# Patient Record
Sex: FEMALE | Race: WHITE | NOT HISPANIC OR LATINO
[De-identification: names, ages, dates, MRNs, and addresses within clinical notes are randomized per-mention and may not be internally consistent; named-entity substitution may affect disease eponyms.]

---

## 2022-04-03 ENCOUNTER — HEALTH MAINTENANCE LETTER (OUTPATIENT)
Age: 22
End: 2022-04-03

## 2022-05-06 ASSESSMENT — SLEEP AND FATIGUE QUESTIONNAIRES
HOW LIKELY ARE YOU TO NOD OFF OR FALL ASLEEP WHILE LYING DOWN TO REST IN THE AFTERNOON WHEN CIRCUMSTANCES PERMIT: MODERATE CHANCE OF DOZING
HOW LIKELY ARE YOU TO NOD OFF OR FALL ASLEEP IN A CAR, WHILE STOPPED FOR A FEW MINUTES IN TRAFFIC: HIGH CHANCE OF DOZING
HOW LIKELY ARE YOU TO NOD OFF OR FALL ASLEEP WHILE SITTING AND READING: SLIGHT CHANCE OF DOZING
HOW LIKELY ARE YOU TO NOD OFF OR FALL ASLEEP WHILE WATCHING TV: MODERATE CHANCE OF DOZING
HOW LIKELY ARE YOU TO NOD OFF OR FALL ASLEEP WHILE SITTING QUIETLY AFTER LUNCH WITHOUT ALCOHOL: MODERATE CHANCE OF DOZING
HOW LIKELY ARE YOU TO NOD OFF OR FALL ASLEEP WHEN YOU ARE A PASSENGER IN A CAR FOR AN HOUR WITHOUT A BREAK: HIGH CHANCE OF DOZING
HOW LIKELY ARE YOU TO NOD OFF OR FALL ASLEEP WHILE SITTING AND TALKING TO SOMEONE: SLIGHT CHANCE OF DOZING
HOW LIKELY ARE YOU TO NOD OFF OR FALL ASLEEP WHILE SITTING INACTIVE IN A PUBLIC PLACE: HIGH CHANCE OF DOZING

## 2022-05-09 ENCOUNTER — CARE COORDINATION (OUTPATIENT)
Dept: SLEEP MEDICINE | Facility: CLINIC | Age: 22
End: 2022-05-09

## 2022-05-09 ENCOUNTER — VIRTUAL VISIT (OUTPATIENT)
Dept: SLEEP MEDICINE | Facility: CLINIC | Age: 22
End: 2022-05-09
Payer: COMMERCIAL

## 2022-05-09 VITALS — WEIGHT: 293 LBS | HEIGHT: 68 IN | BODY MASS INDEX: 44.41 KG/M2

## 2022-05-09 DIAGNOSIS — G47.10 HYPERSOMNIA: Primary | ICD-10-CM

## 2022-05-09 DIAGNOSIS — E66.01 MORBID OBESITY (H): ICD-10-CM

## 2022-05-09 DIAGNOSIS — E66.01 CLASS 3 SEVERE OBESITY DUE TO EXCESS CALORIES WITHOUT SERIOUS COMORBIDITY WITH BODY MASS INDEX (BMI) OF 45.0 TO 49.9 IN ADULT (H): ICD-10-CM

## 2022-05-09 DIAGNOSIS — E66.813 CLASS 3 SEVERE OBESITY DUE TO EXCESS CALORIES WITHOUT SERIOUS COMORBIDITY WITH BODY MASS INDEX (BMI) OF 45.0 TO 49.9 IN ADULT (H): ICD-10-CM

## 2022-05-09 DIAGNOSIS — G47.33 OSA (OBSTRUCTIVE SLEEP APNEA): ICD-10-CM

## 2022-05-09 PROCEDURE — 99204 OFFICE O/P NEW MOD 45 MIN: CPT | Mod: 95 | Performed by: FAMILY MEDICINE

## 2022-05-09 NOTE — PROGRESS NOTES
"Oksana is a 21 year old who is being evaluated via a billable video visit.      How would you like to obtain your AVS? MyChart  If the video visit is dropped, the invitation should be resent by: Text to cell phone: [phone]  Will anyone else be joining your video visit? No  {If patient encounters technical issues they should call [phone] :[id number]}    Video Start Time: {video visit start/end time for provider to select:152948}  Video-Visit Details    Type of service:  Video Visit    Video End Time:{video visit start/end time for provider to select:152948}    Originating Location (pt. Location): {video visit patient location:668951::\"Home\"}    Distant Location (provider location):  [facility]     Platform used for Video Visit: {Virtual Visit Platforms:195225::\"WhiteFence\"}Sarah Salomon  "

## 2022-05-09 NOTE — PROGRESS NOTES
"Oksana Rios is a 21 year old female who is being evaluated via a billable video visit.       The patient has been notified of following:      \"This video visit will be conducted via a call between you and your physician/provider. We have found that certain health care needs can be provided without the need for an in-person physical exam.  This service lets us provide the care you need with a video conversation.  If a prescription is necessary we can send it directly to your pharmacy.  If lab work is needed we can place an order for that and you can then stop by our lab to have the test done at a later time.     Video visits are billed at different rates depending on your insurance coverage.  Please reach out to your insurance provider with any questions.     If during the course of the call the physician/provider feels a video visit is not appropriate, you will not be charged for this service.\"     Patient has given verbal consent for Video visit? Yes  How would you like to obtain your AVS? Mail a copy  If you are dropped from the video visit, the video invite should be resent to: Text to cell phone: -  Will anyone else be joining your video visit? No  If patient encounters technical issues they should call [phone]      Video-Visit Details     Type of service:  Video Visit     Video Start Time: 1pm  Video End Time: 1:50pm    Originating Location (pt. Location): Home     Distant Location (provider location):  [facility]      Platform used for Video Visit: Veam Video    Virtual visit for excessive daytime sleepiness with a history of mild obstructive sleep apnea managed with CPAP.     Assessment / Plan:    1.)  Very mild obstructive sleep apnea managed with CPAP  2.)  Excessive daytime sleepiness  3.)  Morbid obesity with unclear current weight/BMI    Overall, she does appear to reports consistent excessive daytime sleepiness with an elevated Gifford score.  It does appear " that this has not significantly improved with treatment of very mild obstructive sleep apnea, though it is possible that obstructive sleep apnea has changed given there is potential for interim weight changes since her initial home sleep testing.    Given this history of consistent daytime sleepiness, reported minimal improvement with treatment of mild obstructive sleep apnea, younger age, I feel that there is some suggestion or potential for organic hypersomnia.  It is noted that narcolepsy triad is negative.  I feel it is reasonable for repeat testing, and I would recommend actigraphy with PSG (discussed not using her CPAP for 2 weeks prior to testing) with MSLT if no significant findings, along with morning urine toxicology.  These orders were placed today.    Given that TSH, CBC, comprehensive metabolic panel had been checked after the onset of symptoms, I did not see a strong need to repeat those at this time.  We discussed that obesity may also play significant role if no significant hypersomnia seen in respect to chronic daytime fatigue.      SUBJECTIVE:  Oksana iRos is a 21 year old female.    Pertinent PMHx of obesity, anxiety, GOPAL, depression, ADHD, dermatillomania.    Prior Sleep Testin2021 - HST with [employer], with weight 310 lbs, BMI 46.5.  RDI 7.7.  Jose SpO2 79%, <= 88% for 0.2 minutes.    Reviewed visit with Eileen Holliday CNP of [facility] sleep medicine 2021.  Treated with CPAP auto-titrate 5-20 cm H2O with what appears adequate usage and AHI < 0.5.  Note of chronic sleep onset insomnia, referred for CBT-I.    Today -she presents today in regards to getting a second opinion on excessive daytime sleepiness that has not improved since the start of CPAP for mild obstructive sleep apnea.    I did not have a CPAP download for review today, but she reports that she has used it consistently with benefit for reducing snoring and gasping arousals, but does not feel that has  had a significant improvement in daytime sleepiness.  I was able to review some of the records from [facility] sleep medicine, though I did not see specific discussion of hypersomnia as part of her concerns.    She feels that her excessive daytime sleepiness began in early 2020, and this prompted an initial laboratory work-up in February 2020 including TSH, hemoglobin, comprehensive metabolic panel and all of these returned within normal limits.  She feels that her daytime sleepiness has gradually worsened over time.  At this time, she was nearing the end of her college degree and does not feel that there is a significant change in her work stress, school load or sleep pattern.    She identifies that the daytime sleepiness is most concerning in regards to her morning commute to work.  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  When working as a paraprofessional, she has an approximately 45-minute commute each way.  She notes on her drive to work, that she will consistently feel very sleepy after driving for roughly 25-30 minutes.  She will then need to stop, pull over and go the car and walk around for few minutes in order to feel safe enough to drive the remainder of the commute to work.  She feels that she has less issues with her drive home from work.  She does feel that she has excessive sleepiness and other situations where she finds that she will fall asleep in quiet times at home but also at work and this has also become concerning to her.    She does not have any known family history of sleep apnea or hypersomnia or other sleep concerns.    We do not have a current weight on file, though she feels that her weight is stable since her home sleep test.    She denies sleep paralysis, denies hypnagogic or hypnopompic hallucinations, denies cataplexy.    She notes some occasional morning headaches.    Her current medication regimen  includes:  - Effexor  - Guanfacine prescribed for ADHD.  She notes that she was previously prescribed Concerta but had side effects of increasing depression.  She notes that she was taking the Concerta in early 2020 during the time of the onset of her sleepiness and did not seem to have an appreciable benefit for it and also did not seem to worsen it after it was discontinued.  -Oral contraceptive pill, recently changed from a combined estrogen and progesterone to a progesterone only formulation.  She is unsure why.    We reviewed her sleep-wake pattern.  On workdays, she usually go to bed around 10 PM and fall asleep quickly, she will have 0-1 brief awakenings, she will awaken by alarm at 5:30 AM he denies difficulty awakening or feeling significant morning grogginess.  2 times per week she will take a 1-1.5-hour nap, she is unclear if this has benefit or not.  She estimates her total sleep time around 7.5-8 hours.    On weekends or off days, she will sleep from 11 PM until 8 AM.  She is unsure if her sleepiness is better on weekends, since she typically is not having the longer drives where she would notice issues, and potentially would have less likelihood of falling asleep and quite activities during the day.      Insomnia Severity Index    Difficulty falling asleep None    Difficulty staying asleep Mild    Problems waking up too early None    How SATISFIED/DISSATISFIED are you with your CURRENT sleep pattern? Dissatisfied    How NOTICEABLE to others do you think your sleep problem is in terms of impairing the quality of your life? Somewhat    How WORRIED/DISTRESSED are you about your current sleep problem? Somewhat    To what extent do you consider your sleep problem to INTERFERE with your daily functioning (e.g. daytime fatigue, mood, ability to function at work/daily chores, concentration, memory, mood, etc.) CURRENTLY? Much    Total Score 11        Past medical history:    There are no problems to display  for this patient.      10 point ROS of systems including Constitutional, Eyes, Respiratory, Cardiovascular, Gastroenterology, Genitourinary, Integumentary, Muscularskeletal, Psychiatric were all negative except for pertinent positives noted in my HPI.    No current outpatient medications on file.       OBJECTIVE:  There were no vitals taken for this visit.    Physical Exam     ---  This note was written with the assistance of the Dragon voice-dictation technology software. The final document, although reviewed, may contain errors. For corrections, please contact the office.    Alejandro Madrigal MD    Sleep Medicine  [facility]  ([phone])  [facility]  ([phone])    Time spent on the date of service:  55 minutes.

## 2022-05-18 ENCOUNTER — TELEPHONE (OUTPATIENT)
Dept: SLEEP MEDICINE | Facility: CLINIC | Age: 22
End: 2022-05-18
Payer: COMMERCIAL

## 2022-05-18 NOTE — NURSING NOTE
Message sent to [facility] that patient needs to be scheduled for an Actigraphy, PSG, and MSLT.  They will contact patient to schedule. Veronica Martinez, LORIE

## 2022-09-06 ENCOUNTER — OFFICE VISIT (OUTPATIENT)
Dept: SLEEP MEDICINE | Facility: CLINIC | Age: 22
End: 2022-09-06
Payer: COMMERCIAL

## 2022-09-06 DIAGNOSIS — G47.10 HYPERSOMNIA: Primary | ICD-10-CM

## 2022-09-18 ENCOUNTER — THERAPY VISIT (OUTPATIENT)
Dept: SLEEP MEDICINE | Facility: CLINIC | Age: 22
End: 2022-09-18
Payer: COMMERCIAL

## 2022-09-18 DIAGNOSIS — G47.33 OSA (OBSTRUCTIVE SLEEP APNEA): ICD-10-CM

## 2022-09-18 DIAGNOSIS — E66.01 CLASS 3 SEVERE OBESITY DUE TO EXCESS CALORIES WITHOUT SERIOUS COMORBIDITY WITH BODY MASS INDEX (BMI) OF 45.0 TO 49.9 IN ADULT (H): ICD-10-CM

## 2022-09-18 DIAGNOSIS — E66.813 CLASS 3 SEVERE OBESITY DUE TO EXCESS CALORIES WITHOUT SERIOUS COMORBIDITY WITH BODY MASS INDEX (BMI) OF 45.0 TO 49.9 IN ADULT (H): ICD-10-CM

## 2022-09-18 DIAGNOSIS — G47.10 HYPERSOMNIA: ICD-10-CM

## 2022-09-18 PROCEDURE — 95810 POLYSOM 6/> YRS 4/> PARAM: CPT | Performed by: INTERNAL MEDICINE

## 2022-09-19 ENCOUNTER — THERAPY VISIT (OUTPATIENT)
Dept: SLEEP MEDICINE | Facility: CLINIC | Age: 22
End: 2022-09-19
Payer: COMMERCIAL

## 2022-09-19 ENCOUNTER — DOCUMENTATION ONLY (OUTPATIENT)
Dept: SLEEP MEDICINE | Facility: CLINIC | Age: 22
End: 2022-09-19

## 2022-09-19 DIAGNOSIS — G47.10 HYPERSOMNIA: Primary | ICD-10-CM

## 2022-09-19 LAB
AMPHETAMINES UR QL: NOT DETECTED
BARBITURATES UR QL SCN: NOT DETECTED
BENZODIAZ UR QL SCN: NOT DETECTED
BUPRENORPHINE UR QL: NOT DETECTED
CANNABINOIDS UR QL: NOT DETECTED
COCAINE UR QL SCN: NOT DETECTED
D-METHAMPHET UR QL: NOT DETECTED
METHADONE UR QL SCN: NOT DETECTED
OPIATES UR QL SCN: NOT DETECTED
OXYCODONE UR QL SCN: NOT DETECTED
PCP UR QL SCN: NOT DETECTED
PROPOXYPH UR QL: NOT DETECTED
TRICYCLICS UR QL SCN: NOT DETECTED

## 2022-09-19 PROCEDURE — 95805 MULTIPLE SLEEP LATENCY TEST: CPT | Performed by: INTERNAL MEDICINE

## 2022-09-19 PROCEDURE — 80306 DRUG TEST PRSMV INSTRMNT: CPT

## 2022-09-19 NOTE — PATIENT INSTRUCTIONS
[facility]    1. Your sleep study will be reviewed by a sleep physician within the next few days.     2. Please follow up in the sleep clinic as scheduled, or, make an appointment with your sleep provider to be seen within two weeks to discuss the results of the sleep study.    3. If you have any questions or problems with your treatment plan, please contact your sleep clinic provider at [phone] to further manage your condition.    4. Please review your attached medication list, and, at your follow-up appointment advise your sleep clinic provider about any changes.    5. Go to [email or web address] for more information about your sleep problems.    Adriane Padilla, RPSGT  September 19, 2022

## 2022-09-19 NOTE — NURSING NOTE
Diagnostic PSG w/ transcutaneous C02 monitoring  completed per provider order.  Patient did not meet criteria for PAP therapy.

## 2022-09-22 LAB — SLPCOMP: NORMAL

## 2022-10-07 ENCOUNTER — VIRTUAL VISIT (OUTPATIENT)
Dept: SLEEP MEDICINE | Facility: CLINIC | Age: 22
End: 2022-10-07
Payer: COMMERCIAL

## 2022-10-07 VITALS — WEIGHT: 293 LBS | BODY MASS INDEX: 44.41 KG/M2 | HEIGHT: 68 IN

## 2022-10-07 DIAGNOSIS — G47.63 BRUXISM, SLEEP-RELATED: ICD-10-CM

## 2022-10-07 DIAGNOSIS — R06.83 SNORING: Primary | ICD-10-CM

## 2022-10-07 DIAGNOSIS — G47.10 PERSISTENT HYPERSOMNIA: ICD-10-CM

## 2022-10-07 DIAGNOSIS — F51.12 INSUFFICIENT SLEEP SYNDROME: ICD-10-CM

## 2022-10-07 DIAGNOSIS — G47.61 PERIODIC LIMB MOVEMENT DISORDER (PLMD): ICD-10-CM

## 2022-10-07 PROCEDURE — 99214 OFFICE O/P EST MOD 30 MIN: CPT | Mod: 95 | Performed by: NURSE PRACTITIONER

## 2022-10-07 RX ORDER — ACETAMINOPHEN AND CODEINE PHOSPHATE 120; 12 MG/5ML; MG/5ML
SOLUTION ORAL
COMMUNITY
Start: 2022-04-25

## 2022-10-07 RX ORDER — GUANFACINE 2 MG/1
TABLET ORAL
COMMUNITY
Start: 2022-09-11

## 2022-10-07 RX ORDER — VENLAFAXINE HYDROCHLORIDE 150 MG/1
CAPSULE, EXTENDED RELEASE ORAL
COMMUNITY
Start: 2022-09-11

## 2022-10-07 ASSESSMENT — SLEEP AND FATIGUE QUESTIONNAIRES
HOW LIKELY ARE YOU TO NOD OFF OR FALL ASLEEP WHILE WATCHING TV: MODERATE CHANCE OF DOZING
HOW LIKELY ARE YOU TO NOD OFF OR FALL ASLEEP WHILE SITTING AND TALKING TO SOMEONE: SLIGHT CHANCE OF DOZING
HOW LIKELY ARE YOU TO NOD OFF OR FALL ASLEEP WHILE SITTING QUIETLY AFTER LUNCH WITHOUT ALCOHOL: SLIGHT CHANCE OF DOZING
HOW LIKELY ARE YOU TO NOD OFF OR FALL ASLEEP WHILE SITTING INACTIVE IN A PUBLIC PLACE: MODERATE CHANCE OF DOZING
HOW LIKELY ARE YOU TO NOD OFF OR FALL ASLEEP WHILE SITTING AND READING: MODERATE CHANCE OF DOZING
HOW LIKELY ARE YOU TO NOD OFF OR FALL ASLEEP WHILE LYING DOWN TO REST IN THE AFTERNOON WHEN CIRCUMSTANCES PERMIT: MODERATE CHANCE OF DOZING
HOW LIKELY ARE YOU TO NOD OFF OR FALL ASLEEP IN A CAR, WHILE STOPPED FOR A FEW MINUTES IN TRAFFIC: MODERATE CHANCE OF DOZING
HOW LIKELY ARE YOU TO NOD OFF OR FALL ASLEEP WHEN YOU ARE A PASSENGER IN A CAR FOR AN HOUR WITHOUT A BREAK: HIGH CHANCE OF DOZING

## 2022-10-07 NOTE — PATIENT INSTRUCTIONS
**Recommend increasing the amount of your sleep to 8-9 hours per night.  **May also consider alternative causes for your daytime sleepiness through your PCP.      Your BMI is Body mass index is 45.61 kg/m .    What is BMI?  Body mass index (BMI) is one way to tell whether you are at a healthy weight, overweight, or obese. It measures your weight in relation to your height.  A BMI of 18.5 to 24.9 is in the healthy range. A person with a BMI of 25 to 29.9 is considered overweight, and someone with a BMI of 30 or greater is considered obese.  Another way to find out if you are at risk for health problems caused by overweight and obesity is to measure your waist. If you are a woman and your waist is more than 35 inches, or if you are a man and your waist is more than 40 inches, your risk of disease may be higher.  More than two-thirds of American adults are considered overweight or obese. Being overweight or obese increases the risk for further weight gain.  Excess weight may lead to heart disease and diabetes. Creating and following plans for healthy eating and physical activity may help you improve your health.    Methods for maintaining or losing weight.  Weight control is part of healthy lifestyle and includes exercise, emotional health, and healthy eating habits.  Careful eating habits lifelong is the mainstay of weight control.  Though there are significant health benefits from weight loss, long-term weight loss with diet alone may be very difficult to achieve- studies show long-term success with dietary management in less than 10% of people. Attaining a healthy weight may be especially difficult to achieve in those with severe obesity. In some cases, medications, devices and surgical management might be considered.    What can you do?  If you are overweight or obese and are interested in methods for weight loss, you should discuss this with your provider. In addition, we recommend that you review healthy life  styles and methods for weight loss available through the National Institutes of Health patient information sites:   http://win.niddk.nih.gov/publications/index.htm

## 2022-10-07 NOTE — PROGRESS NOTES
Oksana is a 22 year old who is being evaluated via a billable video visit.      How would you like to obtain your AVS? MyChart  If the video visit is dropped, the invitation should be resent by: Send to e-mail at: [email or web address]  Will anyone else be joining your video visit? Terri  Tiffanie Ann        Video-Visit Details    Video Start Time: 10:12 AM    Type of service:  Video Visit    Video End Time:10:30 AM    Originating Location (pt. Location): Home    Distant Location (provider location):  [facility] [place]     Platform used for Video Visit: [facility]       Sleep Study Follow-Up Visit:    Date on this visit: 10/7/2022    Oksana Rios is a 22-year-old female with a PMH pertinent for ADHD, depression, anxiety, dermatillomania, GOPAL, and obesity who presents today for follow-up of her sleep study done on 9/18/2022 at the [facility] was performed to evaluate for hypersomnia.  She was last seen by Dr. Alejandro Madrigal by virtual visit on 5/9/2022.    Sleep latency 8.1 minutes without Ambien.  REM achieved.   REM latency 100.0 minutes.  Sleep efficiency 92.1%. Total sleep time 449.5 minutes.    Snoring - was reported as moderate to severe.    Sleep architecture:  Stage 1, 6.6% (5%), stage 2, 41.3% (45-55%), stage 3, 12.6% (15-20%), stage REM, 39.6% (20-25%).  AHI was 3.2, with desaturations down to 88%. RDI 4.0.  REM RDI 6.7, consistent with mild REM GOPAL.  Supine RDI 3.2, consistent with no SUPINE GOPAL.  Periodic Limb Movement Index 22.4/hour.  The PLM Arousal Index was 6.9 per hour     9/18/2022 [facility] (300.0 lbs) - AHI 3.2, RDI 4.0, Supine AHI 3.2, REM AHI 6.7, Low O2 88.0%, Time Spent ?88% 0.1 minutes / Time Spent ?89% 0.1 minutes.  MSLT Results: UA was negative. The patient s mean sleep latency was within normal limits. Her first 3 naps showed decreased sleep latency which could be suggestive of insufficient sleep or circadian  "phase delay. This test is not conclusive for idiopathic hypersomnia or narcolepsy.  These findings were reviewed with patient.     [facility], with weight 310 lbs, BMI 46.5.  RDI 7.7.  Jose SpO2 79%, <= 88% for 0.2 minutes.     Reviewed visit with Eileen Holliday CNP of [facility] sleep medicine 2021.  Treated with CPAP auto-titrate 5-20 cm H2O with what appears adequate usage and AHI < 0.5.  Note of chronic sleep onset insomnia, referred for CBT-I.      Past medical/surgical history, family history, social history, medications and allergies were reviewed.      Problem List:  Patient Active Problem List    Diagnosis Date Noted     Morbid obesity (H) 2022     Priority: Medium      Current Outpatient Medications   Medication     fexofenadine (ALLEGRA) 30 MG ODT     guanFACINE (TENEX) 2 MG tablet     norethindrone (MICRONOR) 0.35 MG tablet     venlafaxine (EFFEXOR XR) 150 MG 24 hr capsule     No current facility-administered medications for this visit.     Ht 1.727 m (5' 8\")   Wt 136.1 kg (300 lb)   BMI 45.61 kg/m      Impression/Plan:  1. Snoring  2. Bruxism, sleep-related  3. Periodic limb movement disorder (PLMD)  4. Persistent hypersomnia, unspecified  5.  Insufficient sleep syndrome, chronic  - Negative for clinically significant sleep disordered breathing.   - Sleep associated hypoxemia was not present.  - Mean sleep latency was within normal limits. Her first 3 naps showed decreased sleep latency which could be suggestive of insufficient sleep or circadian phase delay. This test is not conclusive for idiopathic hypersomnia or narcolepsy.     Treatment options discussed today including:  Try Increasing total sleep time to 8-9 hours each night.  We also discussed weight loss as the patient is able to help improve overall risk for sleep apnea and general health.  Alternatively, further evaluation for other possible causes of EDS/hypersomnia, such as " metabolic work-up.    She will follow up with me as needed or if symptoms worsen or fail to improve.    **A copy of the PSG and MSLT results to be mailed to the patient, as requested.    30 minutes spent with patient, all of which were spent face-to-face counseling, consulting, chart review/documentation, and coordinating plan of care on the date of the encounter.      LINWOOD Knight CNP  Sleep Medicine      CC: No Ref-Primary, Physician     This note was written with the assistance of the Dragon voice-dictation technology software. The final document, although reviewed, may contain errors. For corrections, please contact the office.

## 2022-10-07 NOTE — NURSING NOTE
Pt to follow up as needed if symptoms worsen or fail to improve.    Mailed copy of PSG and MSLT to patient.    Christophe Roberts CMA

## 2023-05-21 ENCOUNTER — HEALTH MAINTENANCE LETTER (OUTPATIENT)
Age: 23
End: 2023-05-21

## 2023-05-30 ENCOUNTER — TELEPHONE (OUTPATIENT)
Dept: OBGYN | Facility: CLINIC | Age: 23
End: 2023-05-30
Payer: COMMERCIAL

## 2023-05-30 NOTE — TELEPHONE ENCOUNTER
Left VM for Patient to reschedule appt on 6/8.  Midwives are available sooner for the patient as a new with pap.

## 2024-04-27 ENCOUNTER — OFFICE VISIT (OUTPATIENT)
Dept: FAMILY MEDICINE | Facility: CLINIC | Age: 24
End: 2024-04-27
Payer: COMMERCIAL

## 2024-04-27 VITALS
HEIGHT: 68 IN | HEART RATE: 85 BPM | WEIGHT: 293 LBS | TEMPERATURE: 98.3 F | BODY MASS INDEX: 44.41 KG/M2 | RESPIRATION RATE: 18 BRPM

## 2024-04-27 DIAGNOSIS — J03.90 EXUDATIVE TONSILLITIS: Primary | ICD-10-CM

## 2024-04-27 DIAGNOSIS — J02.9 SORE THROAT: ICD-10-CM

## 2024-04-27 LAB
DEPRECATED S PYO AG THROAT QL EIA: NEGATIVE
GROUP A STREP BY PCR: NOT DETECTED

## 2024-04-27 PROCEDURE — 87651 STREP A DNA AMP PROBE: CPT | Performed by: STUDENT IN AN ORGANIZED HEALTH CARE EDUCATION/TRAINING PROGRAM

## 2024-04-27 PROCEDURE — 99213 OFFICE O/P EST LOW 20 MIN: CPT | Performed by: STUDENT IN AN ORGANIZED HEALTH CARE EDUCATION/TRAINING PROGRAM

## 2024-04-27 RX ORDER — PENICILLIN V POTASSIUM 500 MG/1
500 TABLET, FILM COATED ORAL 2 TIMES DAILY
Qty: 20 TABLET | Refills: 0 | Status: SHIPPED | OUTPATIENT
Start: 2024-04-27 | End: 2024-05-07

## 2024-04-27 RX ORDER — DEXTROAMPHETAMINE SACCHARATE, AMPHETAMINE ASPARTATE MONOHYDRATE, DEXTROAMPHETAMINE SULFATE AND AMPHETAMINE SULFATE 3.75; 3.75; 3.75; 3.75 MG/1; MG/1; MG/1; MG/1
CAPSULE, EXTENDED RELEASE ORAL
COMMUNITY

## 2024-04-27 NOTE — PROGRESS NOTES
"Assessment & Plan     Exudative tonsillitis  Patient works at an elementary school and had strep 2 months ago that presented similarly however it was on the right side.  Her rapid strep is negative though she has markedly exudative tonsils and is primarily having pain on the left side.  She is not having difficulty passing foods other than the pain but no signs of obstruction.  I advised treating empirically for strep with penicillin while awaiting the strep PCR as I suspect it will be positive.  We discussed the possibility of peritonsillar abscess and advised that she would go to the ER if she is having any difficulty with breathing or swallowing or progressively worsening symptoms.  - penicillin V (VEETID) 500 MG tablet  Dispense: 20 tablet; Refill: 0    Sore throat  - Streptococcus A Rapid Screen w/Reflex to PCR - Clinic Collect  - Group A Streptococcus PCR Throat Swab         No follow-ups on file.    Jenna Snyder, LINWOOD CNP  [facility]    Елена Gandhi is a 23 year old adult who presents to clinic today for the following health issues:  Chief Complaint   Patient presents with    Throat Problem     Pain when swallowing more on the L side also some ear pain/ stuffy and cloaked. Pt also state she have white spots also feeling clammy. 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     HPI      Review of Systems  Constitutional, HEENT, cardiovascular, pulmonary, gi and gu systems are negative, except as otherwise noted.      Objective    Pulse 85   Temp 98.3  F (36.8  C) (Oral)   Resp 18   Ht 1.727 m (5' 8\")   Wt 148 kg (326 lb 4.8 oz)   LMP 04/08/2024 (Approximate)   BMI 49.61 kg/m    Physical Exam   GENERAL: alert and no distress  EYES: Eyes grossly normal to inspection, PERRL and conjunctivae and sclerae normal  HENT: normal cephalic/atraumatic, both ears: TM scarring with dull TM, and oral mucous membranes moist, hypertrophied tonsils with erythema and exudates bilaterally, " L>R  NECK: bilateral anterior cervical adenopathy and no asymmetry, masses, or scars  RESP: lungs clear to auscultation - no rales, rhonchi or wheezes  CV: regular rate and rhythm, normal S1 S2, no S3 or S4, no murmur, click or rub, no peripheral edema  MS: no gross musculoskeletal defects noted, no edema  SKIN: no suspicious lesions or rashes  NEURO: Normal strength and tone, mentation intact and speech normal  PSYCH: mentation appears normal, affect normal/bright    Results for orders placed or performed in visit on 04/27/24 (from the past 24 hour(s))   Streptococcus A Rapid Screen w/Reflex to PCR - Clinic Collect    Specimen: Throat; Swab   Result Value Ref Range    Group A Strep antigen Negative Negative

## 2025-05-18 ENCOUNTER — HEALTH MAINTENANCE LETTER (OUTPATIENT)
Age: 25
End: 2025-05-18